# Patient Record
Sex: FEMALE | Race: WHITE | NOT HISPANIC OR LATINO | Employment: FULL TIME | URBAN - METROPOLITAN AREA
[De-identification: names, ages, dates, MRNs, and addresses within clinical notes are randomized per-mention and may not be internally consistent; named-entity substitution may affect disease eponyms.]

---

## 2017-02-20 ENCOUNTER — RECORDS - HEALTHEAST (OUTPATIENT)
Dept: LAB | Facility: CLINIC | Age: 57
End: 2017-02-20

## 2017-02-20 LAB
CHOLEST SERPL-MCNC: 143 MG/DL
FASTING STATUS PATIENT QL REPORTED: NORMAL
HDLC SERPL-MCNC: 51 MG/DL
LDLC SERPL CALC-MCNC: 65 MG/DL
TRIGL SERPL-MCNC: 135 MG/DL

## 2017-10-03 ENCOUNTER — RECORDS - HEALTHEAST (OUTPATIENT)
Dept: LAB | Facility: CLINIC | Age: 57
End: 2017-10-03

## 2017-10-03 LAB
CHOLEST SERPL-MCNC: 175 MG/DL
FASTING STATUS PATIENT QL REPORTED: ABNORMAL
HDLC SERPL-MCNC: 54 MG/DL
LDLC SERPL CALC-MCNC: 88 MG/DL
TRIGL SERPL-MCNC: 166 MG/DL

## 2017-10-11 ENCOUNTER — HOSPITAL ENCOUNTER (OUTPATIENT)
Dept: MAMMOGRAPHY | Facility: HOSPITAL | Age: 57
Discharge: HOME | End: 2017-10-11
Attending: FAMILY MEDICINE

## 2017-10-11 DIAGNOSIS — Z12.31 VISIT FOR SCREENING MAMMOGRAM: ICD-10-CM

## 2019-02-18 ENCOUNTER — HOSPITAL ENCOUNTER (OUTPATIENT)
Dept: MAMMOGRAPHY | Facility: CLINIC | Age: 59
Discharge: HOME | End: 2019-02-18
Attending: FAMILY MEDICINE

## 2019-02-18 DIAGNOSIS — Z12.31 VISIT FOR SCREENING MAMMOGRAM: ICD-10-CM

## 2019-03-06 ENCOUNTER — RECORDS - HEALTHEAST (OUTPATIENT)
Dept: LAB | Facility: CLINIC | Age: 59
End: 2019-03-06

## 2019-03-06 LAB
ALBUMIN SERPL-MCNC: 3.9 G/DL (ref 3.5–5)
ALP SERPL-CCNC: 61 U/L (ref 45–120)
ALT SERPL W P-5'-P-CCNC: 41 U/L (ref 0–45)
ANION GAP SERPL CALCULATED.3IONS-SCNC: 8 MMOL/L (ref 5–18)
AST SERPL W P-5'-P-CCNC: 29 U/L (ref 0–40)
BILIRUB SERPL-MCNC: 0.5 MG/DL (ref 0–1)
BUN SERPL-MCNC: 21 MG/DL (ref 8–22)
CALCIUM SERPL-MCNC: 9.9 MG/DL (ref 8.5–10.5)
CHLORIDE BLD-SCNC: 102 MMOL/L (ref 98–107)
CHOLEST SERPL-MCNC: 171 MG/DL
CO2 SERPL-SCNC: 30 MMOL/L (ref 22–31)
CREAT SERPL-MCNC: 0.87 MG/DL (ref 0.6–1.1)
FASTING STATUS PATIENT QL REPORTED: NORMAL
GFR SERPL CREATININE-BSD FRML MDRD: >60 ML/MIN/1.73M2
GLUCOSE BLD-MCNC: 109 MG/DL (ref 70–125)
HDLC SERPL-MCNC: 57 MG/DL
LDLC SERPL CALC-MCNC: 85 MG/DL
POTASSIUM BLD-SCNC: 4 MMOL/L (ref 3.5–5)
PROT SERPL-MCNC: 6.5 G/DL (ref 6–8)
SODIUM SERPL-SCNC: 140 MMOL/L (ref 136–145)
TRIGL SERPL-MCNC: 145 MG/DL

## 2020-02-10 ENCOUNTER — RECORDS - HEALTHEAST (OUTPATIENT)
Dept: LAB | Facility: CLINIC | Age: 60
End: 2020-02-10

## 2020-02-10 LAB — FSH SERPL-ACNC: 15.7 MIU/ML

## 2020-03-03 ENCOUNTER — HOSPITAL ENCOUNTER (OUTPATIENT)
Dept: MAMMOGRAPHY | Facility: CLINIC | Age: 60
Discharge: HOME | End: 2020-03-03
Attending: FAMILY MEDICINE

## 2020-03-03 DIAGNOSIS — Z12.31 VISIT FOR SCREENING MAMMOGRAM: ICD-10-CM

## 2020-03-18 ENCOUNTER — ANESTHESIA - HEALTHEAST (OUTPATIENT)
Dept: SURGERY | Facility: HOSPITAL | Age: 60
End: 2020-03-18

## 2020-03-18 ASSESSMENT — MIFFLIN-ST. JEOR: SCORE: 1605.95

## 2020-03-19 ENCOUNTER — SURGERY - HEALTHEAST (OUTPATIENT)
Dept: SURGERY | Facility: HOSPITAL | Age: 60
End: 2020-03-19

## 2020-03-19 ASSESSMENT — MIFFLIN-ST. JEOR: SCORE: 1610.94

## 2020-07-21 ENCOUNTER — RECORDS - HEALTHEAST (OUTPATIENT)
Dept: LAB | Facility: CLINIC | Age: 60
End: 2020-07-21

## 2020-07-21 LAB
ALBUMIN SERPL-MCNC: 4.2 G/DL (ref 3.5–5)
ALP SERPL-CCNC: 73 U/L (ref 45–120)
ALT SERPL W P-5'-P-CCNC: 74 U/L (ref 0–45)
ANION GAP SERPL CALCULATED.3IONS-SCNC: 11 MMOL/L (ref 5–18)
AST SERPL W P-5'-P-CCNC: 52 U/L (ref 0–40)
BILIRUB SERPL-MCNC: 0.3 MG/DL (ref 0–1)
BUN SERPL-MCNC: 18 MG/DL (ref 8–22)
CALCIUM SERPL-MCNC: 10 MG/DL (ref 8.5–10.5)
CHLORIDE BLD-SCNC: 104 MMOL/L (ref 98–107)
CHOLEST SERPL-MCNC: 285 MG/DL
CO2 SERPL-SCNC: 25 MMOL/L (ref 22–31)
CREAT SERPL-MCNC: 0.84 MG/DL (ref 0.6–1.1)
FASTING STATUS PATIENT QL REPORTED: ABNORMAL
GFR SERPL CREATININE-BSD FRML MDRD: >60 ML/MIN/1.73M2
GLUCOSE BLD-MCNC: 97 MG/DL (ref 70–125)
HDLC SERPL-MCNC: 50 MG/DL
LDLC SERPL CALC-MCNC: 160 MG/DL
POTASSIUM BLD-SCNC: 4.2 MMOL/L (ref 3.5–5)
PROT SERPL-MCNC: 6.9 G/DL (ref 6–8)
SODIUM SERPL-SCNC: 140 MMOL/L (ref 136–145)
TRIGL SERPL-MCNC: 377 MG/DL

## 2021-03-08 ENCOUNTER — RECORDS - HEALTHEAST (OUTPATIENT)
Dept: LAB | Facility: CLINIC | Age: 61
End: 2021-03-08

## 2021-03-08 LAB
ALBUMIN SERPL-MCNC: 3.9 G/DL (ref 3.5–5)
ALP SERPL-CCNC: 65 U/L (ref 45–120)
ALT SERPL W P-5'-P-CCNC: 32 U/L (ref 0–45)
ANION GAP SERPL CALCULATED.3IONS-SCNC: 9 MMOL/L (ref 5–18)
AST SERPL W P-5'-P-CCNC: 25 U/L (ref 0–40)
BILIRUB SERPL-MCNC: 0.4 MG/DL (ref 0–1)
BUN SERPL-MCNC: 20 MG/DL (ref 8–22)
CALCIUM SERPL-MCNC: 9.3 MG/DL (ref 8.5–10.5)
CHLORIDE BLD-SCNC: 108 MMOL/L (ref 98–107)
CHOLEST SERPL-MCNC: 152 MG/DL
CO2 SERPL-SCNC: 25 MMOL/L (ref 22–31)
CREAT SERPL-MCNC: 0.81 MG/DL (ref 0.6–1.1)
FASTING STATUS PATIENT QL REPORTED: ABNORMAL
GFR SERPL CREATININE-BSD FRML MDRD: >60 ML/MIN/1.73M2
GLUCOSE BLD-MCNC: 89 MG/DL (ref 70–125)
HDLC SERPL-MCNC: 53 MG/DL
LDLC SERPL CALC-MCNC: 65 MG/DL
POTASSIUM BLD-SCNC: 4.5 MMOL/L (ref 3.5–5)
PROT SERPL-MCNC: 6.4 G/DL (ref 6–8)
SODIUM SERPL-SCNC: 142 MMOL/L (ref 136–145)
TRIGL SERPL-MCNC: 168 MG/DL

## 2021-03-09 LAB — HCV AB SERPL QL IA: NEGATIVE

## 2021-03-23 ENCOUNTER — HOSPITAL ENCOUNTER (OUTPATIENT)
Dept: MAMMOGRAPHY | Facility: CLINIC | Age: 61
Discharge: HOME | End: 2021-03-23
Attending: FAMILY MEDICINE

## 2021-03-23 DIAGNOSIS — Z12.31 VISIT FOR SCREENING MAMMOGRAM: ICD-10-CM

## 2021-05-25 ENCOUNTER — RECORDS - HEALTHEAST (OUTPATIENT)
Dept: ADMINISTRATIVE | Facility: CLINIC | Age: 61
End: 2021-05-25

## 2021-05-27 ENCOUNTER — RECORDS - HEALTHEAST (OUTPATIENT)
Dept: ADMINISTRATIVE | Facility: CLINIC | Age: 61
End: 2021-05-27

## 2021-05-31 ENCOUNTER — RECORDS - HEALTHEAST (OUTPATIENT)
Dept: ADMINISTRATIVE | Facility: CLINIC | Age: 61
End: 2021-05-31

## 2021-06-04 VITALS — WEIGHT: 229.1 LBS | HEIGHT: 66 IN | BODY MASS INDEX: 36.82 KG/M2

## 2021-06-07 NOTE — ANESTHESIA CARE TRANSFER NOTE
Last vitals:   Vitals:    03/19/20 0958   BP: 162/78   Pulse: 78   Resp: 12   Temp: 36.4  C (97.6  F)   SpO2: 96%     Patient's level of consciousness is drowsy  Spontaneous respirations: yes  Maintains airway independently: yes  Dentition unchanged: yes  Oropharynx: oropharynx clear of all foreign objects    QCDR Measures:  ASA# 20 - Surgical Safety Checklist: WHO surgical safety checklist completed prior to induction    PQRS# 430 - Adult PONV Prevention: 4558F - Pt received => 2 anti-emetic agents (different classes) preop & intraop  ASA# 8 - Peds PONV Prevention: NA - Not pediatric patient, not GA or 2 or more risk factors NOT present  PQRS# 424 - Lisy-op Temp Management: 4559F - At least one body temp DOCUMENTED => 35.5C or 95.9F within required timeframe  PQRS# 426 - PACU Transfer Protocol: - Transfer of care checklist used  ASA# 14 - Acute Post-op Pain: ASA14B - Patient did NOT experience pain >= 7 out of 10

## 2021-06-07 NOTE — ANESTHESIA POSTPROCEDURE EVALUATION
Patient: Richelle Guadalupe  Procedure(s):  ROBOTIC TOTAL LAPAROSCOPIC HYSTERECTOMY, BILATERAL SALPINGO OOPHORECTOMY, DILITATION AND CURETTAGE WITH FROZEN SECTION, CYSTOSCOPY (Bilateral)  DILATION AND CURETTAGE, UTERUS  Anesthesia type: general    Patient location: PACU  Last vitals:   Vitals Value Taken Time   /79 3/19/2020 10:50 AM   Temp 36.5  C (97.7  F) 3/19/2020 10:50 AM   Pulse 80 3/19/2020 10:56 AM   Resp 14 3/19/2020 10:56 AM   SpO2 97 % 3/19/2020 10:56 AM   Vitals shown include unvalidated device data.  Post vital signs: stable  Level of consciousness: very alert and conversing  Post-anesthesia pain: pain controlled  Post-anesthesia nausea and vomiting: no  Pulmonary: supplemental oxygen  Cardiovascular: stable and blood pressure at baseline  Hydration: adequate  Anesthetic events: no    QCDR Measures:  ASA# 11 - Lisy-op Cardiac Arrest: ASA11B - Patient did NOT experience unanticipated cardiac arrest  ASA# 12 - Lisy-op Mortality Rate: ASA12B - Patient did NOT die  ASA# 13 - PACU Re-Intubation Rate: ASA13B - Patient did NOT require a new airway mgmt  ASA# 10 - Composite Anes Safety: ASA10A - No serious adverse event    Additional Notes:

## 2021-06-07 NOTE — ANESTHESIA PREPROCEDURE EVALUATION
Anesthesia Evaluation        Airway   Mallampati: I  Neck ROM: full   Pulmonary - normal exam   (+) sleep apnea on CPAP, ,                          Cardiovascular - normal exam  Exercise tolerance: > or = 4 METS  (+) , hypercholesterolemia,      Neuro/Psych - negative ROS     Endo/Other    (+) obesity,      GI/Hepatic/Renal - negative ROS      Other findings: Menorrhagia. Stress incontinence.  BMI 36.        Dental - normal exam                        Anesthesia Plan  Planned anesthetic: general endotracheal  Mg++, scop patch, ketamine after induction, toradol at end if ok with surgeon.  Consider background diprivan infusion.  Consented for TAP blocks should they be requested.  ASA 3   Induction: intravenous   Anesthetic plan and risks discussed with: patient  Anesthesia plan special considerations: antiemetics,   Post-op plan: routine recovery

## 2021-06-27 ENCOUNTER — HEALTH MAINTENANCE LETTER (OUTPATIENT)
Age: 61
End: 2021-06-27

## 2021-07-03 NOTE — ADDENDUM NOTE
Addendum Note by Nelson Nicholas CRNA at 3/19/2020 12:07 PM     Author: Nelson Nicholas CRNA Service: -- Author Type: Nurse Anesthetist    Filed: 3/19/2020 12:07 PM Date of Service: 3/19/2020 12:07 PM Status: Signed    : Nelson Nicholas CRNA (Nurse Anesthetist)       Addendum  created 03/19/20 1207 by Nelson Nicholas CRNA    Intraprocedure Flowsheets edited

## 2021-07-13 ENCOUNTER — RECORDS - HEALTHEAST (OUTPATIENT)
Dept: ADMINISTRATIVE | Facility: CLINIC | Age: 61
End: 2021-07-13

## 2021-07-21 ENCOUNTER — RECORDS - HEALTHEAST (OUTPATIENT)
Dept: ADMINISTRATIVE | Facility: CLINIC | Age: 61
End: 2021-07-21

## 2021-10-17 ENCOUNTER — HEALTH MAINTENANCE LETTER (OUTPATIENT)
Age: 61
End: 2021-10-17

## 2022-05-23 ENCOUNTER — ANCILLARY PROCEDURE (OUTPATIENT)
Dept: MAMMOGRAPHY | Facility: CLINIC | Age: 62
End: 2022-05-23
Attending: FAMILY MEDICINE
Payer: COMMERCIAL

## 2022-05-23 DIAGNOSIS — Z12.31 SCREENING MAMMOGRAM, ENCOUNTER FOR: ICD-10-CM

## 2022-05-23 PROCEDURE — 77067 SCR MAMMO BI INCL CAD: CPT

## 2022-07-24 ENCOUNTER — HEALTH MAINTENANCE LETTER (OUTPATIENT)
Age: 62
End: 2022-07-24

## 2022-10-02 ENCOUNTER — HEALTH MAINTENANCE LETTER (OUTPATIENT)
Age: 62
End: 2022-10-02

## 2022-12-06 ENCOUNTER — LAB REQUISITION (OUTPATIENT)
Dept: LAB | Facility: CLINIC | Age: 62
End: 2022-12-06

## 2022-12-06 DIAGNOSIS — R73.03 PREDIABETES: ICD-10-CM

## 2022-12-06 DIAGNOSIS — E78.5 HYPERLIPIDEMIA, UNSPECIFIED: ICD-10-CM

## 2022-12-06 LAB
ALBUMIN SERPL BCG-MCNC: 4.5 G/DL (ref 3.5–5.2)
ALP SERPL-CCNC: 77 U/L (ref 35–104)
ALT SERPL W P-5'-P-CCNC: 48 U/L (ref 10–35)
ANION GAP SERPL CALCULATED.3IONS-SCNC: 13 MMOL/L (ref 7–15)
AST SERPL W P-5'-P-CCNC: 33 U/L (ref 10–35)
BILIRUB SERPL-MCNC: 0.4 MG/DL
BUN SERPL-MCNC: 17.3 MG/DL (ref 8–23)
CALCIUM SERPL-MCNC: 9.7 MG/DL (ref 8.8–10.2)
CHLORIDE SERPL-SCNC: 103 MMOL/L (ref 98–107)
CHOLEST SERPL-MCNC: 159 MG/DL
CREAT SERPL-MCNC: 0.86 MG/DL (ref 0.51–0.95)
DEPRECATED HCO3 PLAS-SCNC: 24 MMOL/L (ref 22–29)
GFR SERPL CREATININE-BSD FRML MDRD: 75 ML/MIN/1.73M2
GLUCOSE SERPL-MCNC: 131 MG/DL (ref 70–99)
HBA1C MFR BLD: 7.5 %
HDLC SERPL-MCNC: 48 MG/DL
LDLC SERPL CALC-MCNC: 89 MG/DL
NONHDLC SERPL-MCNC: 111 MG/DL
POTASSIUM SERPL-SCNC: 4.8 MMOL/L (ref 3.4–5.3)
PROT SERPL-MCNC: 6.6 G/DL (ref 6.4–8.3)
SODIUM SERPL-SCNC: 140 MMOL/L (ref 136–145)
TRIGL SERPL-MCNC: 111 MG/DL

## 2022-12-06 PROCEDURE — 83036 HEMOGLOBIN GLYCOSYLATED A1C: CPT | Performed by: FAMILY MEDICINE

## 2022-12-06 PROCEDURE — 80053 COMPREHEN METABOLIC PANEL: CPT | Performed by: FAMILY MEDICINE

## 2022-12-06 PROCEDURE — 80061 LIPID PANEL: CPT | Performed by: FAMILY MEDICINE

## 2023-05-20 ENCOUNTER — HEALTH MAINTENANCE LETTER (OUTPATIENT)
Age: 63
End: 2023-05-20

## 2024-04-05 ENCOUNTER — TRANSFERRED RECORDS (OUTPATIENT)
Dept: HEALTH INFORMATION MANAGEMENT | Facility: CLINIC | Age: 64
End: 2024-04-05
Payer: COMMERCIAL

## 2024-04-18 ENCOUNTER — MEDICAL CORRESPONDENCE (OUTPATIENT)
Dept: HEALTH INFORMATION MANAGEMENT | Facility: CLINIC | Age: 64
End: 2024-04-18
Payer: COMMERCIAL

## 2024-04-19 ENCOUNTER — TRANSCRIBE ORDERS (OUTPATIENT)
Dept: OTHER | Age: 64
End: 2024-04-19

## 2024-04-19 DIAGNOSIS — R05.3 CHRONIC COUGH: Primary | ICD-10-CM

## 2024-04-22 DIAGNOSIS — R05.3 CHRONIC COUGH: Primary | ICD-10-CM

## 2024-04-22 RX ORDER — ALBUTEROL SULFATE 0.83 MG/ML
2.5 SOLUTION RESPIRATORY (INHALATION) ONCE
Status: COMPLETED | OUTPATIENT
Start: 2024-04-23 | End: 2024-04-23

## 2024-04-23 ENCOUNTER — HOSPITAL ENCOUNTER (OUTPATIENT)
Dept: RESPIRATORY THERAPY | Facility: CLINIC | Age: 64
Discharge: HOME | End: 2024-04-23
Admitting: NURSE PRACTITIONER
Payer: COMMERCIAL

## 2024-04-23 ENCOUNTER — OFFICE VISIT (OUTPATIENT)
Dept: PULMONOLOGY | Facility: CLINIC | Age: 64
End: 2024-04-23
Payer: COMMERCIAL

## 2024-04-23 VITALS
HEART RATE: 67 BPM | OXYGEN SATURATION: 96 % | WEIGHT: 179 LBS | DIASTOLIC BLOOD PRESSURE: 85 MMHG | HEIGHT: 66 IN | SYSTOLIC BLOOD PRESSURE: 144 MMHG | BODY MASS INDEX: 28.77 KG/M2

## 2024-04-23 DIAGNOSIS — J45.30 MILD PERSISTENT REACTIVE AIRWAY DISEASE WITHOUT COMPLICATION: Primary | ICD-10-CM

## 2024-04-23 DIAGNOSIS — R09.82 PND (POST-NASAL DRIP): ICD-10-CM

## 2024-04-23 DIAGNOSIS — R05.3 CHRONIC COUGH: Primary | ICD-10-CM

## 2024-04-23 DIAGNOSIS — R05.3 CHRONIC COUGH: ICD-10-CM

## 2024-04-23 LAB — HGB BLD-MCNC: 14 G/DL (ref 11.7–15.7)

## 2024-04-23 PROCEDURE — 90677 PCV20 VACCINE IM: CPT | Performed by: NURSE PRACTITIONER

## 2024-04-23 PROCEDURE — 94729 DIFFUSING CAPACITY: CPT

## 2024-04-23 PROCEDURE — G0009 ADMIN PNEUMOCOCCAL VACCINE: HCPCS | Performed by: NURSE PRACTITIONER

## 2024-04-23 PROCEDURE — 94060 EVALUATION OF WHEEZING: CPT

## 2024-04-23 PROCEDURE — 36415 COLL VENOUS BLD VENIPUNCTURE: CPT | Performed by: NURSE PRACTITIONER

## 2024-04-23 PROCEDURE — 250N000009 HC RX 250: Performed by: NURSE PRACTITIONER

## 2024-04-23 PROCEDURE — 999N000157 HC STATISTIC RCP TIME EA 10 MIN

## 2024-04-23 PROCEDURE — 99204 OFFICE O/P NEW MOD 45 MIN: CPT | Mod: 25 | Performed by: NURSE PRACTITIONER

## 2024-04-23 PROCEDURE — 94726 PLETHYSMOGRAPHY LUNG VOLUMES: CPT

## 2024-04-23 PROCEDURE — 85018 HEMOGLOBIN: CPT | Performed by: NURSE PRACTITIONER

## 2024-04-23 RX ORDER — BENZONATATE 200 MG/1
CAPSULE ORAL
COMMUNITY
Start: 2024-04-05 | End: 2024-06-10

## 2024-04-23 RX ORDER — SEMAGLUTIDE 1.34 MG/ML
1 INJECTION, SOLUTION SUBCUTANEOUS
COMMUNITY
Start: 2023-02-15

## 2024-04-23 RX ORDER — FLUTICASONE PROPIONATE 50 MCG
1 SPRAY, SUSPENSION (ML) NASAL DAILY
Qty: 16 G | Refills: 3 | Status: SHIPPED | OUTPATIENT
Start: 2024-04-23

## 2024-04-23 RX ORDER — BUDESONIDE AND FORMOTEROL FUMARATE DIHYDRATE 80; 4.5 UG/1; UG/1
2 AEROSOL RESPIRATORY (INHALATION) 2 TIMES DAILY
Qty: 10.2 G | Refills: 4 | Status: SHIPPED | OUTPATIENT
Start: 2024-04-23

## 2024-04-23 RX ORDER — ALBUTEROL SULFATE 90 UG/1
AEROSOL, METERED RESPIRATORY (INHALATION)
COMMUNITY
Start: 2024-02-20

## 2024-04-23 RX ORDER — ATORVASTATIN CALCIUM 20 MG/1
TABLET, FILM COATED ORAL
COMMUNITY
Start: 2022-03-08

## 2024-04-23 RX ADMIN — ALBUTEROL SULFATE 2.5 MG: 2.5 SOLUTION RESPIRATORY (INHALATION) at 07:23

## 2024-04-23 NOTE — PROGRESS NOTES
Pulmonary Outpatient Consult Note  April 23, 2024      Assessment and Plan:   Richelle Guadalupe is a 64 year old female, former smoker, with history of BRANDON (no current CPAP use), HLD presenting today for evaluation of subacute cough.   Cough, PND, and nasal congestion started a few months ago following COVID (tested positive at home). She has had multiple antibiotics and prednisone x 1 and albuterol with relief but the cough has persisted. Worst at night.   No personal or family history of asthma.     PFTs show moderately-severe obstruction with significant bronchodilator response, air trapping, and normal diffusing capacity.   No chest imaging.     Lung exam today demonstrates expiratory wheeze with minimal scatter rhonchi. SpO2 is normal.        #. Cough: Given onset following a URI this is likely post inflammatory response versus underlying reactive airways. As albuterol has been very helpful and there is a very + BD response on PFTs we will trial a daily ICS/LABA.   START Symbicort (80-4.5), 2 puffs BID. Rinse and gargle after each use.   Once resolution of symptoms we will leave her on this for 2-4 weeks and then start to peel this back.   Continue albuterol q4h prn  If continued symptoms at follow up with obtain chest imaging, check [facility], allergy panel, and IgE.   #. PND, nasal congestion: Likely triggering cough. Nighttime symptoms exacerbated by this.   Saline sinus rinses BID  Flonase after saline rinses BID  If continued congestion at follow up with refer to ENT  #. BRANDON, not on CPAP: Has not been using since her machine was recalled. Following weight loss she no longer snores and feels she no longer has BRANDON.   Recommended follow up with sleep medicine for repeat PSG.   #. HCM: UTD with COVID vaccine and booster, TDAP, seasonal flu.  Recommend RSV vaccine  Given PCV 20 today in clinic      RTC 6-8 weeks for recheck    Audra Villarreal CNP  Pulmonary  Medicine  ______________________________________________________________________________    CC:   Chief Complaint   Patient presents with    Consult     R05.3 (ICD-10-CM) - Chronic cough  PFT 24    Patient states she has a CPAP but quit using it.     HPI:   Richelle presents for evaluation of subacte cough.   Cough and nasal congestion started in February following COVID.  Initially had chest tightness, wheezing, shortness of breath and treated with albuterol nebulizer with improvement, azithromycin and prednisone. Given doxycycline and benzonatate at last PCP visit on .     Still having cough and nasal congestion. The symptoms are worse at night when laying down. Productive of yellow sputum.  Denies fever or hemoptysis.   Still having some SOB with activity like stairs or hiking. She still rides her bike and lifts weights daily.   15 pack year history, quit in .     No history of seasonal allergies or issues with chronic nasal congestion in the past.     On Ozempic for weight loss and DM2. She has been on this since 2023.     FH-father had lung cancer. No family history of asthma.     PMH:  There are no problems to display for this patient.    PSH:  Past Surgical History:   Procedure Laterality Date    APPENDECTOMY      BLADDER SUSPENSION       SECTION      HC DILATION/CURETTAGE DIAG/THER NON OB N/A 3/19/2020    Procedure: DILATION AND CURETTAGE, UTERUS;  Surgeon: Christiana Arvizu MD;  [place] [facility] [place]  Service: [place]necology    LAPAROSCOPIC HYSTERECTOMY Bilateral 3/19/2020    Procedure: ROBOTIC TOTAL LAPAROSCOPIC HYSTERECTOMY, BILATERAL SALPINGO OOPHORECTOMY, DILITATION AND CURETTAGE WITH FROZEN SECTION, CYSTOSCOPY;  Surgeon: Christiana Arvizu MD;  [place] [facility] [place]  Service: [place]necology     Current Meds:  Current Outpatient Medications   Medication Sig Dispense Refill    albuterol (PROAIR HFA/PROVENTIL HFA/VENTOLIN HFA) 108 (90 Base) MCG/ACT inhaler INHALE 1  PUFF INTO LUNGS EVERY 4 HOURS AS NEEDED FOR SHORTNESS OF BREATH*      atorvastatin (LIPITOR) 20 MG tablet       B.ani/L.aci/L.sal/L.plan/L.Alex (PROBIOTIC FORMULA ORAL) [B.ANI/L.ACI/L.SAL/L.PLAN/L.ALEX (PROBIOTIC FORMULA ORAL)] Take 1 capsule by mouth daily.       benzonatate (TESSALON) 200 MG capsule TAKE 1 CAPSULE BY MOUTH 3 TIMES DAILY FOR 5 DAYS*      budesonide-formoterol (SYMBICORT) 80-4.5 MCG/ACT Inhaler Inhale 2 puffs into the lungs 2 times daily 10.2 g 4    fish oil-omega-3 fatty acids 300-1,000 mg capsule [FISH OIL-OMEGA-3 FATTY ACIDS 300-1,000 MG CAPSULE] Take 1 g by mouth daily.      fluticasone (FLONASE) 50 MCG/ACT nasal spray Spray 1 spray into both nostrils daily 16 g 3    magnesium oxide 250 mg magnesium Tab [MAGNESIUM OXIDE 250 MG MAGNESIUM TAB] Take 250 mg by mouth daily as needed.      mometasone (ELOCON) 0.1 % cream [MOMETASONE (ELOCON) 0.1 % CREAM] Apply 1 application topically daily as needed.       OZEMPIC, 1 MG/DOSE, 4 MG/3ML pen 1 mg      TURMERIC ORAL [TURMERIC ORAL] Take 1 tablet by mouth daily.       UNABLE TO FIND [UNABLE TO FIND] Take 1 Dose by mouth daily. Med Name: Collagen Peptide Powder       cinnamon bark (CINNAMON ORAL) [CINNAMON BARK (CINNAMON ORAL)] Take 1 packet by mouth daily. The patient uses powder      ibuprofen (ADVIL,MOTRIN) 600 MG tablet [IBUPROFEN (ADVIL,MOTRIN) 600 MG TABLET] Take 1 tablet (600 mg total) by mouth every 6 (six) hours as needed for pain. 30 tablet 0    magnesium 30 mg tablet [MAGNESIUM 30 MG TABLET] Take 30 mg by mouth 2 (two) times a day.      oxyCODONE (ROXICODONE) 5 MG immediate release tablet [OXYCODONE (ROXICODONE) 5 MG IMMEDIATE RELEASE TABLET] Take 1 tablet (5 mg total) by mouth every 6 (six) hours as needed for pain. 12 tablet 0     No current facility-administered medications for this visit.     Allergies:  No Known Allergies    Social Hx:  Marital status: lives with    Number of children:  1 son  Resides in a house,  3yr old, no concern  "for mold.  Occupational history:  or marketing   service: None  Pets: 2 dogs   Smoking history: 3 pack year history  Alcohol use: occasional   Recreational drug use: none, no vape use    Family HX: family history includes Cancer in her father; Heart Disease in her father.    ROS:   10-point review performed and notable for the above mentioned symtoms. The remainder reviewed and negative.     Physical Exam:  BP (!) 144/85 (BP Location: Right arm, Patient Position: Sitting, Cuff Size: Adult Regular)   Pulse 67   Ht 1.676 m (5' 6\")   Wt 81.2 kg (179 lb)   SpO2 96%   BMI 28.89 kg/m      Physical Exam  Constitutional:       General: She is not in acute distress.     Appearance: She is not ill-appearing or diaphoretic.   HENT:      Nose: Congestion present.   Cardiovascular:      Rate and Rhythm: Normal rate and regular rhythm.      Heart sounds: Normal heart sounds.   Pulmonary:      Effort: Pulmonary effort is normal. No respiratory distress.      Breath sounds: Wheezing (end expiratory, diffuse) and rhonchi (scattered, mild, expiratory) present.   Musculoskeletal:      Right lower leg: No edema.      Left lower leg: No edema.   Neurological:      Mental Status: She is alert.   Psychiatric:         Behavior: Behavior normal.       PFT's     4/23/2024      Impression:  Full Pulmonary Function Test is abnormal.  PFTs are consistent with moderate-severe obstructive disease.  Spirometry is consistent with reversibility.  There is no hyperinflation.  There is air-trapping.  Diffusion capacity when corrected for hemoglobin is not reduced.    Labs:   personally reviewed in the EMR.    Imaging studies: personally reviewed and interpreted. Below are the Radiology interpretations.  None.                               "

## 2024-04-23 NOTE — PATIENT INSTRUCTIONS
It was a pleasure seeing you in clinic today. This is what we discussed:    We will have you start a daily long acting inhaler to calm down the inflammation in your airways. Use this every day as prescribed no matter what. Remember to rinse and gargle.   Use your albuterol every 4 hours as needed for cough, shortness of breath, wheeze, or chest tightness. This is short acting.   Do saline sinus rinses at least twice daily. Follow this with Flonase.   Follow up 6 weeks   If you have worsening symptoms, questions, or need to speak with the nurse please call [phone].  I recommend the RSV vaccine. You received the pneumonia vaccine today.

## 2024-04-23 NOTE — PROGRESS NOTES
RESPIRATORY CARE NOTE    Complete Pulmonary Function Test completed by patient.  Good patient effort and cooperation. Albuterol 2.5 mg neb given for bronchodilation.  The results of this test meet the ATS standards for acceptability and repeatability. PT returned to baseline and left in no distress.    Rajwinder Jamil, RT  4/23/2024

## 2024-04-24 LAB
DLCOCOR-%PRED-PRE: 99 %
DLCOCOR-PRE: 20.21 ML/MIN/MMHG
DLCOUNC-%PRED-PRE: 101 %
DLCOUNC-PRE: 20.57 ML/MIN/MMHG
DLCOUNC-PRED: 20.26 ML/MIN/MMHG
ERV-%PRED-PRE: 33 %
ERV-PRE: 0.38 L
ERV-PRED: 1.11 L
EXPTIME-PRE: 7.12 SEC
FEF2575-%PRED-POST: 85 %
FEF2575-%PRED-PRE: 30 %
FEF2575-POST: 1.71 L/SEC
FEF2575-PRE: 0.61 L/SEC
FEF2575-PRED: 2 L/SEC
FEFMAX-%PRED-PRE: 59 %
FEFMAX-PRE: 3.7 L/SEC
FEFMAX-PRED: 6.24 L/SEC
FEV1-%PRED-PRE: 56 %
FEV1-PRE: 1.3 L
FEV1FEV6-PRE: 59 %
FEV1FEV6-PRED: 80 %
FEV1FVC-PRE: 57 %
FEV1FVC-PRED: 79 %
FEV1SVC-PRE: 57 %
FEV1SVC-PRED: 69 %
FIFMAX-PRE: 3.29 L/SEC
FRCPLETH-%PRED-PRE: 138 %
FRCPLETH-PRE: 3.91 L
FRCPLETH-PRED: 2.82 L
FVC-%PRED-PRE: 77 %
FVC-PRE: 2.27 L
FVC-PRED: 2.94 L
IC-%PRED-PRE: 85 %
IC-PRE: 1.9 L
IC-PRED: 2.23 L
RVPLETH-%PRED-PRE: 168 %
RVPLETH-PRE: 3.53 L
RVPLETH-PRED: 2.09 L
TLCPLETH-%PRED-PRE: 110 %
TLCPLETH-PRE: 5.81 L
TLCPLETH-PRED: 5.27 L
VA-%PRED-PRE: 104 %
VA-PRE: 5.14 L
VC-%PRED-PRE: 67 %
VC-PRE: 2.28 L
VC-PRED: 3.37 L

## 2024-06-03 ENCOUNTER — ANCILLARY PROCEDURE (OUTPATIENT)
Dept: MAMMOGRAPHY | Facility: CLINIC | Age: 64
End: 2024-06-03
Attending: FAMILY MEDICINE
Payer: COMMERCIAL

## 2024-06-03 DIAGNOSIS — Z12.31 VISIT FOR SCREENING MAMMOGRAM: ICD-10-CM

## 2024-06-03 PROCEDURE — 77063 BREAST TOMOSYNTHESIS BI: CPT

## 2024-06-10 ENCOUNTER — OFFICE VISIT (OUTPATIENT)
Dept: PULMONOLOGY | Facility: CLINIC | Age: 64
End: 2024-06-10
Attending: NURSE PRACTITIONER
Payer: COMMERCIAL

## 2024-06-10 VITALS
WEIGHT: 188 LBS | HEART RATE: 78 BPM | BODY MASS INDEX: 30.34 KG/M2 | DIASTOLIC BLOOD PRESSURE: 76 MMHG | SYSTOLIC BLOOD PRESSURE: 138 MMHG | OXYGEN SATURATION: 98 %

## 2024-06-10 DIAGNOSIS — J30.2 SEASONAL ALLERGIC RHINITIS, UNSPECIFIED TRIGGER: ICD-10-CM

## 2024-06-10 DIAGNOSIS — R05.3 CHRONIC COUGH: Primary | ICD-10-CM

## 2024-06-10 PROCEDURE — 99214 OFFICE O/P EST MOD 30 MIN: CPT | Performed by: NURSE PRACTITIONER

## 2024-06-10 NOTE — PROGRESS NOTES
Pulmonary Outpatient Consult Note  [place] 10, 2024      Assessment and Plan:   Richelle Guadalupe is a 64 year old female, former smoker, with history of BRANDON (no current CPAP use), HLD presenting today for evaluation of subacute cough.   Cough, PND, and nasal congestion started a few months ago following COVID (tested positive at home). She had multiple antibiotics and prednisone x 1 and albuterol with relief but the cough persisted.  Noted resolution of symptoms with regular Symbicort and Flonase use.  She did develop a hoarse voice.  No personal or family history of asthma.     PFTs show moderately-severe obstruction with significant bronchodilator response, air trapping, and normal diffusing capacity.   No chest imaging.   Lung exam today demonstrates expiratory wheeze with minimal scatter rhonchi. SpO2 is normal.      #. Cough: Given onset following a URI this is likely post inflammatory response versus underlying reactive airways.  Will start to slowly peel back the daily ICS/LABA.  We will investigate if there is an environmental or allergic trigger.  Continue Symbicort (80-4.5), 1 puff BID x 1 month.  If symptoms do not worsen stop use.  At any point if symptoms worsen or return suggest restarting daily use as prescribed.  Rinse and gargle after each use.   Continue albuterol q4h prn  CBC with diff, [place] allergy panel, and IgE.    #. PND, nasal congestion: Resolved with below interventions.  Was likely triggering cough.  Continue saline sinus rinses as needed  Flonase 1-2 times daily as needed  #. BRANDON, not on CPAP: Has not been using since her machine was recalled. Following weight loss she no longer snores and feels she no longer has BRANDON.   Recommended follow up with sleep medicine for repeat PSG.   #. HCM: UTD with COVID vaccine and booster, PCV 20, TDAP, seasonal flu.  Recommend RSV vaccine      RTC 6 months if symptoms return after peeling back ICS     Audra Villarreal, CNP  Pulmonary  "Medicine  ______________________________________________________________________________    CC:   Chief Complaint   Patient presents with    Follow Up     Mild persistent reactive airway disease without complication    Chronic cough    PND (post-nasal drip)        HPI:   Richelle was last seen in clinic on .  At that time we started a trial of a low-dose ICS/LABA with good results.    Today she reports a hoarse voice or \"voice change\".   No cough, wheeze, or nasal congestion.   Has been using Flonase consistently with resolution of PND. She is interested in an allergy panel to see if she has sensitivities.   Cough and nasal congestion started in February following COVID.  Initially had chest tightness, wheezing, shortness of breath and treated with albuterol nebulizer with improvement, azithromycin and prednisone.     She still rides her bike and lifts weights daily.   15 pack year history, quit in .   No history of seasonal allergies or issues with chronic nasal congestion in the past.   On Ozempic for weight loss and DM2. She has been on this since 2023.     FH-father had lung cancer. No family history of asthma.     PMH:  There are no problems to display for this patient.    PSH:  Past Surgical History:   Procedure Laterality Date    APPENDECTOMY      BLADDER SUSPENSION       SECTION      HC DILATION/CURETTAGE DIAG/THER NON OB N/A 3/19/2020    Procedure: DILATION AND CURETTAGE, UTERUS;  Surgeon: Christiana Arvizu MD;  Location: [facility] - [place];  Service: Gynecology    LAPAROSCOPIC HYSTERECTOMY Bilateral 3/19/2020    Procedure: ROBOTIC TOTAL LAPAROSCOPIC HYSTERECTOMY, BILATERAL SALPINGO OOPHORECTOMY, DILITATION AND CURETTAGE WITH FROZEN SECTION, CYSTOSCOPY;  Surgeon: Christiana Arvizu MD;  Location: [facility] - [place];  Service: Gynecology     Current Meds:  Current Outpatient Medications   Medication Sig Dispense Refill    albuterol (PROAIR HFA/PROVENTIL HFA/VENTOLIN HFA) 108 (90 " Base) MCG/ACT inhaler INHALE 1 PUFF INTO LUNGS EVERY 4 HOURS AS NEEDED FOR SHORTNESS OF BREATH*      atorvastatin (LIPITOR) 20 MG tablet       B.ani/L.aci/L.sal/L.plan/L.Alex (PROBIOTIC FORMULA ORAL) [B.ANI/L.ACI/L.SAL/L.PLAN/L.ALEX (PROBIOTIC FORMULA ORAL)] Take 1 capsule by mouth daily.       budesonide-formoterol (SYMBICORT) 80-4.5 MCG/ACT Inhaler Inhale 2 puffs into the lungs 2 times daily 10.2 g 4    fish oil-omega-3 fatty acids 300-1,000 mg capsule [FISH OIL-OMEGA-3 FATTY ACIDS 300-1,000 MG CAPSULE] Take 1 g by mouth daily.      fluticasone (FLONASE) 50 MCG/ACT nasal spray Spray 1 spray into both nostrils daily 16 g 3    magnesium oxide 250 mg magnesium Tab [MAGNESIUM OXIDE 250 MG MAGNESIUM TAB] Take 250 mg by mouth daily as needed.      mometasone (ELOCON) 0.1 % cream [MOMETASONE (ELOCON) 0.1 % CREAM] Apply 1 application topically daily as needed.       OZEMPIC, 1 MG/DOSE, 4 MG/3ML pen 1 mg      TURMERIC ORAL [TURMERIC ORAL] Take 1 tablet by mouth daily.       UNABLE TO FIND [UNABLE TO FIND] Take 1 Dose by mouth daily. Med Name: Collagen Peptide Powder       benzonatate (TESSALON) 200 MG capsule TAKE 1 CAPSULE BY MOUTH 3 TIMES DAILY FOR 5 DAYS*      cinnamon bark (CINNAMON ORAL) [CINNAMON BARK (CINNAMON ORAL)] Take 1 packet by mouth daily. The patient uses powder      ibuprofen (ADVIL,MOTRIN) 600 MG tablet [IBUPROFEN (ADVIL,MOTRIN) 600 MG TABLET] Take 1 tablet (600 mg total) by mouth every 6 (six) hours as needed for pain. 30 tablet 0    magnesium 30 mg tablet [MAGNESIUM 30 MG TABLET] Take 30 mg by mouth 2 (two) times a day.      oxyCODONE (ROXICODONE) 5 MG immediate release tablet [OXYCODONE (ROXICODONE) 5 MG IMMEDIATE RELEASE TABLET] Take 1 tablet (5 mg total) by mouth every 6 (six) hours as needed for pain. 12 tablet 0     No current facility-administered medications for this visit.     Allergies:  No Known Allergies    Social Hx:  Marital status: lives with    Number of children:  1 son  Resides in a  house,  3yr old, no concern for mold.  Occupational history:  or marketing   service: None  Pets: 2 dogs   Smoking history: 3 pack year history  Alcohol use: occasional   Recreational drug use: none, no vape use    Family HX: family history includes Cancer in her father; Heart Disease in her father.    ROS:   10-point review performed and notable for the above mentioned symtoms. The remainder reviewed and negative.     Physical Exam:  /76 (BP Location: Right arm, Patient Position: Sitting, Cuff Size: Adult Regular)   Pulse 78   Wt 85.3 kg (188 lb)   SpO2 98%   BMI 30.34 kg/m      Physical Exam  Constitutional:       General: She is not in acute distress.     Appearance: She is not ill-appearing or diaphoretic.   Cardiovascular:      Rate and Rhythm: Normal rate and regular rhythm.      Heart sounds: Normal heart sounds.   Pulmonary:      Effort: Pulmonary effort is normal. No respiratory distress.      Breath sounds: No wheezing or rhonchi.   Musculoskeletal:      Right lower leg: No edema.      Left lower leg: No edema.   Neurological:      Mental Status: She is alert.   Psychiatric:         Behavior: Behavior normal.       PFT's     4/23/2024      Impression:  Full Pulmonary Function Test is abnormal.  PFTs are consistent with moderate-severe obstructive disease.  Spirometry is consistent with reversibility.  There is no hyperinflation.  There is air-trapping.  Diffusion capacity when corrected for hemoglobin is not reduced.    Labs:   personally reviewed in the EMR.    Imaging studies: personally reviewed and interpreted. Below are the Radiology interpretations.  None.

## 2024-06-10 NOTE — PATIENT INSTRUCTIONS
It was a pleasure seeing you in clinic today. This is what we discussed:    Drop down to 2 puffs of the Symbicort once daily for 1 month. If no return of symptoms, stop use. At any time if your symptoms return or worsen re-start the Symbicort.   Use your albuterol every 4 hours as needed for cough, shortness of breath, wheeze, or chest tightness.   Follow up 6 months.   If you have worsening symptoms, questions, or need to speak with the nurse please call [phone].

## 2024-06-11 ENCOUNTER — ANCILLARY PROCEDURE (OUTPATIENT)
Dept: MAMMOGRAPHY | Facility: CLINIC | Age: 64
End: 2024-06-11
Attending: FAMILY MEDICINE
Payer: COMMERCIAL

## 2024-06-11 DIAGNOSIS — N64.89 BREAST ASYMMETRY: ICD-10-CM

## 2024-06-11 PROCEDURE — 77065 DX MAMMO INCL CAD UNI: CPT | Mod: RT

## 2024-06-26 ENCOUNTER — LAB (OUTPATIENT)
Dept: LAB | Facility: HOSPITAL | Age: 64
End: 2024-06-26
Payer: COMMERCIAL

## 2024-06-26 DIAGNOSIS — J30.2 SEASONAL ALLERGIC RHINITIS, UNSPECIFIED TRIGGER: ICD-10-CM

## 2024-06-26 LAB
BASOPHILS # BLD AUTO: 0.1 10E3/UL (ref 0–0.2)
BASOPHILS NFR BLD AUTO: 1 %
EOSINOPHIL # BLD AUTO: 0.4 10E3/UL (ref 0–0.7)
EOSINOPHIL NFR BLD AUTO: 5 %
ERYTHROCYTE [DISTWIDTH] IN BLOOD BY AUTOMATED COUNT: 12.5 % (ref 10–15)
HCT VFR BLD AUTO: 45 % (ref 35–47)
HGB BLD-MCNC: 14.3 G/DL (ref 11.7–15.7)
IMM GRANULOCYTES # BLD: 0 10E3/UL
IMM GRANULOCYTES NFR BLD: 0 %
LYMPHOCYTES # BLD AUTO: 1.8 10E3/UL (ref 0.8–5.3)
LYMPHOCYTES NFR BLD AUTO: 26 %
MCH RBC QN AUTO: 29.9 PG (ref 26.5–33)
MCHC RBC AUTO-ENTMCNC: 31.8 G/DL (ref 31.5–36.5)
MCV RBC AUTO: 94 FL (ref 78–100)
MONOCYTES # BLD AUTO: 0.5 10E3/UL (ref 0–1.3)
MONOCYTES NFR BLD AUTO: 7 %
NEUTROPHILS # BLD AUTO: 4.1 10E3/UL (ref 1.6–8.3)
NEUTROPHILS NFR BLD AUTO: 60 %
NRBC # BLD AUTO: 0 10E3/UL
NRBC BLD AUTO-RTO: 0 /100
PLATELET # BLD AUTO: 226 10E3/UL (ref 150–450)
RBC # BLD AUTO: 4.79 10E6/UL (ref 3.8–5.2)
WBC # BLD AUTO: 6.9 10E3/UL (ref 4–11)

## 2024-06-26 PROCEDURE — 36415 COLL VENOUS BLD VENIPUNCTURE: CPT

## 2024-06-26 PROCEDURE — 82785 ASSAY OF IGE: CPT

## 2024-06-26 PROCEDURE — 85025 COMPLETE CBC W/AUTO DIFF WBC: CPT

## 2024-06-28 LAB
A ALTERNATA IGE QN: <0.1 KU(A)/L
A FUMIGATUS IGE QN: 0.12 KU(A)/L
BERMUDA GRASS IGE QN: 0.19 KU(A)/L
C HERBARUM IGE QN: <0.1 KU(A)/L
CAT DANDER IGG QN: <0.1 KU(A)/L
CEDAR IGE QN: 0.11 KU(A)/L
COMMON RAGWEED IGE QN: <0.1 KU(A)/L
COTTONWOOD IGE QN: <0.1 KU(A)/L
D FARINAE IGE QN: <0.1 KU(A)/L
D PTERONYSS IGE QN: <0.1 KU(A)/L
DOG DANDER+EPITH IGE QN: <0.1 KU(A)/L
IGE SERPL-ACNC: 52 KU/L (ref 0–114)
MAPLE IGE QN: <0.1 KU(A)/L
MARSH ELDER IGE QN: <0.1 KU(A)/L
MOUSE URINE PROT IGE QN: <0.1 KU(A)/L
NETTLE IGE QN: <0.1 KU(A)/L
P NOTATUM IGE QN: <0.1 KU(A)/L
ROACH IGE QN: <0.1 KU(A)/L
SALTWORT IGE QN: <0.1 KU(A)/L
SILVER BIRCH IGE QN: <0.1 KU(A)/L
TIMOTHY IGE QN: 0.13 KU(A)/L
WHITE ASH IGE QN: 0.12 KU(A)/L
WHITE ELM IGE QN: 0.15 KU(A)/L
WHITE MULBERRY IGE QN: <0.1 KU(A)/L
WHITE OAK IGE QN: 0.11 KU(A)/L

## 2024-07-27 ENCOUNTER — HEALTH MAINTENANCE LETTER (OUTPATIENT)
Age: 64
End: 2024-07-27

## 2024-07-29 ENCOUNTER — LAB REQUISITION (OUTPATIENT)
Dept: LAB | Facility: CLINIC | Age: 64
End: 2024-07-29

## 2024-07-29 DIAGNOSIS — E78.5 HYPERLIPIDEMIA, UNSPECIFIED: ICD-10-CM

## 2024-07-29 DIAGNOSIS — E11.9 TYPE 2 DIABETES MELLITUS WITHOUT COMPLICATIONS (H): ICD-10-CM

## 2024-07-29 PROCEDURE — 82043 UR ALBUMIN QUANTITATIVE: CPT | Performed by: FAMILY MEDICINE

## 2024-07-29 PROCEDURE — 82040 ASSAY OF SERUM ALBUMIN: CPT | Performed by: FAMILY MEDICINE

## 2024-07-29 PROCEDURE — 80061 LIPID PANEL: CPT | Performed by: FAMILY MEDICINE

## 2024-07-30 LAB
ALBUMIN SERPL BCG-MCNC: 4.3 G/DL (ref 3.5–5.2)
ALP SERPL-CCNC: 51 U/L (ref 40–150)
ALT SERPL W P-5'-P-CCNC: 39 U/L (ref 0–50)
ANION GAP SERPL CALCULATED.3IONS-SCNC: 9 MMOL/L (ref 7–15)
AST SERPL W P-5'-P-CCNC: 26 U/L (ref 0–45)
BILIRUB SERPL-MCNC: 0.4 MG/DL
BUN SERPL-MCNC: 18.1 MG/DL (ref 8–23)
CALCIUM SERPL-MCNC: 9.6 MG/DL (ref 8.8–10.4)
CHLORIDE SERPL-SCNC: 102 MMOL/L (ref 98–107)
CHOLEST SERPL-MCNC: 152 MG/DL
CREAT SERPL-MCNC: 1.08 MG/DL (ref 0.51–0.95)
CREAT UR-MCNC: 30.4 MG/DL
EGFRCR SERPLBLD CKD-EPI 2021: 56 ML/MIN/1.73M2
FASTING STATUS PATIENT QL REPORTED: ABNORMAL
FASTING STATUS PATIENT QL REPORTED: ABNORMAL
GLUCOSE SERPL-MCNC: 96 MG/DL (ref 70–99)
HCO3 SERPL-SCNC: 27 MMOL/L (ref 22–29)
HDLC SERPL-MCNC: 53 MG/DL
LDLC SERPL CALC-MCNC: 68 MG/DL
MICROALBUMIN UR-MCNC: <12 MG/L
MICROALBUMIN/CREAT UR: NORMAL MG/G{CREAT}
NONHDLC SERPL-MCNC: 99 MG/DL
POTASSIUM SERPL-SCNC: 4.2 MMOL/L (ref 3.4–5.3)
PROT SERPL-MCNC: 6.8 G/DL (ref 6.4–8.3)
SODIUM SERPL-SCNC: 138 MMOL/L (ref 135–145)
TRIGL SERPL-MCNC: 157 MG/DL

## 2024-10-05 ENCOUNTER — HEALTH MAINTENANCE LETTER (OUTPATIENT)
Age: 64
End: 2024-10-05

## 2024-12-10 ENCOUNTER — OFFICE VISIT (OUTPATIENT)
Dept: PULMONOLOGY | Facility: CLINIC | Age: 64
End: 2024-12-10
Attending: NURSE PRACTITIONER
Payer: COMMERCIAL

## 2024-12-10 VITALS
HEART RATE: 65 BPM | SYSTOLIC BLOOD PRESSURE: 138 MMHG | BODY MASS INDEX: 30.83 KG/M2 | WEIGHT: 191 LBS | OXYGEN SATURATION: 96 % | DIASTOLIC BLOOD PRESSURE: 84 MMHG

## 2024-12-10 DIAGNOSIS — J45.20 MILD INTERMITTENT REACTIVE AIRWAY DISEASE WITHOUT COMPLICATION: Primary | ICD-10-CM

## 2024-12-10 DIAGNOSIS — J30.2 SEASONAL ALLERGIC RHINITIS, UNSPECIFIED TRIGGER: ICD-10-CM

## 2024-12-10 PROCEDURE — 99214 OFFICE O/P EST MOD 30 MIN: CPT | Performed by: NURSE PRACTITIONER

## 2024-12-10 RX ORDER — BUDESONIDE AND FORMOTEROL FUMARATE DIHYDRATE 80; 4.5 UG/1; UG/1
2 AEROSOL RESPIRATORY (INHALATION) 2 TIMES DAILY PRN
Qty: 10.2 G | Refills: 4 | Status: SHIPPED | OUTPATIENT
Start: 2024-12-10

## 2024-12-10 NOTE — PATIENT INSTRUCTIONS
It was a pleasure seeing you in clinic today. This is what we discussed:    Continue the Symbicort 1-2 times daily as needed for cough.   You have some tree and grass sensitivity, you may have increased symptoms in the spring/summer.   Use your albuterol every 4 hours as needed for cough, shortness of breath, wheeze, or chest tightness.   Follow up as needed   If you have worsening symptoms, questions, or need to speak with the nurse please call [phone].

## 2024-12-10 NOTE — PROGRESS NOTES
Pulmonary Outpatient Clinic Note  December 10, 2024      Assessment and Plan:   Richelle Guadalupe is a 64 year old female, former smoker, with history of BRANDON (no current CPAP use), HLD presenting today for follow up of subacute cough.   Cough, PND, and nasal congestion started a few months ago following COVID (tested positive at home). She had multiple antibiotics and prednisone x 1 and albuterol with relief but the cough persisted.  Noted resolution of symptoms with regular Symbicort and Flonase use.  She did develop a hoarse voice.  No personal or family history of asthma.     PFTs show moderately-severe obstruction with significant bronchodilator response, air trapping, and normal diffusing capacity.   No chest imaging.   Lung exam today demonstrates expiratory wheeze with minimal scatter rhonchi. SpO2 is normal.      #. Cough: Given onset following a URI this is likely post inflammatory response versus underlying asthma/reactive airways. PFTs consistent with asthma. Has stopped daily Symbicort use and denies return of symptoms. She does have some mild sensitivity to grasses and trees on [facility] allergy panel.   Continue Symbicort (80-4.5), 2 puffs BID PRN. At any point if symptoms worsen or return suggest restarting daily use as prescribed until resolution.  Rinse and gargle after each use.   Continue albuterol q4h prn  #. PND, nasal congestion: Resolved with below interventions.  Was likely triggering cough.  Continue saline sinus rinses as needed  Flonase 1-2 times daily as needed  #. BRANDON, not on CPAP: Has not been using since her machine was recalled. Following weight loss she no longer snores and feels she no longer has BRANDON.   Recommended follow up with sleep medicine for repeat PSG.   #. HCM: UTD with COVID vaccine and booster, PCV 20, TDAP, seasonal flu.  Recommend RSV vaccine      RTC prn     Audra Villarreal, THADDEUS  Pulmonary  Medicine  ______________________________________________________________________________    CC:   Chief Complaint   Patient presents with    Follow Up       6 MONTH FOLLOW UP:  Chronic cough  Seasonal allergic rhinitis, unspecified trigger         HPI:   Richelle was last seen in clinic in 2024.  At that time her symptoms have pretty much resolved so she started a taper off of the ICS/LABA with good results.  Currently denies any cough, wheeze, or chest tightness.  Denies return of hoarse voice.  Has been not been using Flonase consistently after resolution of PND.    Cough and nasal congestion started in February following COVID.  Initially had chest tightness, wheezing, shortness of breath and treated with albuterol nebulizer with improvement, azithromycin and prednisone.   She still rides her bike and lifts weights daily.   15 pack year history, quit in .   No history of seasonal allergies or issues with chronic nasal congestion in the past.   On Ozempic for weight loss and DM2. She has been on this since 2023.     FH-father had lung cancer. No family history of asthma.     PMH:  There are no active problems to display for this patient.    PSH:  Past Surgical History:   Procedure Laterality Date    APPENDECTOMY      BLADDER SUSPENSION       SECTION      HC DILATION/CURETTAGE DIAG/THER NON OB N/A 3/19/2020    Procedure: DILATION AND CURETTAGE, UTERUS;  Surgeon: Christiana Arvizu MD;  Location: [facility];  Service: Gynecology    LAPAROSCOPIC HYSTERECTOMY Bilateral 3/19/2020    Procedure: ROBOTIC TOTAL LAPAROSCOPIC HYSTERECTOMY, BILATERAL SALPINGO OOPHORECTOMY, DILITATION AND CURETTAGE WITH FROZEN SECTION, CYSTOSCOPY;  Surgeon: Christiana Arvizu MD;  Location: [facility];  Service: Gynecology     Current Meds:  Current Outpatient Medications   Medication Sig Dispense Refill    atorvastatin (LIPITOR) 20 MG tablet       B.ani/L.aci/L.sal/L.plan/L.Alex (PROBIOTIC FORMULA ORAL)  [B.ANI/L.ACI/L.SAL/L.PLAN/L.KOKO (PROBIOTIC FORMULA ORAL)] Take 1 capsule by mouth daily.       budesonide-formoterol (SYMBICORT) 80-4.5 MCG/ACT Inhaler Inhale 2 puffs into the lungs 2 times daily as needed (for cough or wheeze). 10.2 g 4    fish oil-omega-3 fatty acids 300-1,000 mg capsule [FISH OIL-OMEGA-3 FATTY ACIDS 300-1,000 MG CAPSULE] Take 1 g by mouth daily.      magnesium oxide 250 mg magnesium Tab [MAGNESIUM OXIDE 250 MG MAGNESIUM TAB] Take 250 mg by mouth daily as needed.      mometasone (ELOCON) 0.1 % cream [MOMETASONE (ELOCON) 0.1 % CREAM] Apply 1 application topically daily as needed.       OZEMPIC, 1 MG/DOSE, 4 MG/3ML pen 1 mg      TURMERIC ORAL [TURMERIC ORAL] Take 1 tablet by mouth daily.       UNABLE TO FIND [UNABLE TO FIND] Take 1 Dose by mouth daily. Med Name: Collagen Peptide Powder       albuterol (PROAIR HFA/PROVENTIL HFA/VENTOLIN HFA) 108 (90 Base) MCG/ACT inhaler INHALE 1 PUFF INTO LUNGS EVERY 4 HOURS AS NEEDED FOR SHORTNESS OF BREATH* (Patient not taking: Reported on 12/10/2024)      cinnamon bark (CINNAMON ORAL) [CINNAMON BARK (CINNAMON ORAL)] Take 1 packet by mouth daily. The patient uses powder      fluticasone (FLONASE) 50 MCG/ACT nasal spray Spray 1 spray into both nostrils daily (Patient not taking: Reported on 12/10/2024) 16 g 3    ibuprofen (ADVIL,MOTRIN) 600 MG tablet [IBUPROFEN (ADVIL,MOTRIN) 600 MG TABLET] Take 1 tablet (600 mg total) by mouth every 6 (six) hours as needed for pain. 30 tablet 0    oxyCODONE (ROXICODONE) 5 MG immediate release tablet [OXYCODONE (ROXICODONE) 5 MG IMMEDIATE RELEASE TABLET] Take 1 tablet (5 mg total) by mouth every 6 (six) hours as needed for pain. 12 tablet 0     No current facility-administered medications for this visit.     Allergies:  No Known Allergies    Social Hx:  Marital status: lives with    Number of children:  1 son  Resides in a house,  3yr old, no concern for mold.  Occupational history:  or marketing   service:  None  Pets: 2 dogs   Smoking history: 3 pack year history  Alcohol use: occasional   Recreational drug use: none, no vape use    Family HX: family history includes Cancer in her father; Heart Disease in her father.    ROS:   10-point review performed and notable for the above mentioned symtoms. The remainder reviewed and negative.     Physical Exam:  /84   Pulse 65   Wt 86.6 kg (191 lb)   SpO2 96%   BMI 30.83 kg/m      Physical Exam  Constitutional:       General: She is not in acute distress.     Appearance: She is not ill-appearing or diaphoretic.   Cardiovascular:      Rate and Rhythm: Normal rate and regular rhythm.      Heart sounds: Normal heart sounds.   Pulmonary:      Effort: Pulmonary effort is normal. No respiratory distress.      Breath sounds: No wheezing or rhonchi.   Musculoskeletal:      Right lower leg: No edema.      Left lower leg: No edema.   Neurological:      Mental Status: She is alert.   Psychiatric:         Behavior: Behavior normal.       PFT's     4/23/2024      Impression:  Full Pulmonary Function Test is abnormal.  PFTs are consistent with moderate-severe obstructive disease.  Spirometry is consistent with reversibility.  There is no hyperinflation.  There is air-trapping.  Diffusion capacity when corrected for hemoglobin is not reduced.    Labs:   personally reviewed in the EMR.    Imaging studies: personally reviewed and interpreted. Below are the Radiology interpretations.  None.

## 2024-12-23 ENCOUNTER — ANCILLARY PROCEDURE (OUTPATIENT)
Dept: MAMMOGRAPHY | Facility: CLINIC | Age: 64
End: 2024-12-23
Attending: FAMILY MEDICINE
Payer: COMMERCIAL

## 2024-12-23 DIAGNOSIS — R92.1 BREAST CALCIFICATION SEEN ON MAMMOGRAM: ICD-10-CM

## 2024-12-23 DIAGNOSIS — R92.8 BI-RADS CATEGORY 3 MAMMOGRAM RESULT: ICD-10-CM

## 2024-12-23 PROCEDURE — 77065 DX MAMMO INCL CAD UNI: CPT | Mod: RT

## 2025-01-19 ENCOUNTER — HEALTH MAINTENANCE LETTER (OUTPATIENT)
Age: 65
End: 2025-01-19

## 2025-04-27 ENCOUNTER — HEALTH MAINTENANCE LETTER (OUTPATIENT)
Age: 65
End: 2025-04-27

## 2025-06-27 ENCOUNTER — ANCILLARY PROCEDURE (OUTPATIENT)
Dept: MAMMOGRAPHY | Facility: CLINIC | Age: 65
End: 2025-06-27
Attending: FAMILY MEDICINE
Payer: COMMERCIAL

## 2025-06-27 DIAGNOSIS — R92.8 CATEGORY 3 MAMMOGRAPHY RESULT WITH SHORT FOLLOW-UP INTERVAL SUGGESTED FOR PROBABLY BENIGN FINDING: ICD-10-CM

## 2025-06-27 PROCEDURE — 77062 BREAST TOMOSYNTHESIS BI: CPT

## 2025-08-10 ENCOUNTER — HEALTH MAINTENANCE LETTER (OUTPATIENT)
Age: 65
End: 2025-08-10

## 2025-08-21 ENCOUNTER — LAB REQUISITION (OUTPATIENT)
Dept: LAB | Facility: CLINIC | Age: 65
End: 2025-08-21
Payer: COMMERCIAL

## 2025-08-21 DIAGNOSIS — E11.9 TYPE 2 DIABETES MELLITUS WITHOUT COMPLICATIONS (H): ICD-10-CM

## 2025-08-21 DIAGNOSIS — E78.5 HYPERLIPIDEMIA, UNSPECIFIED: ICD-10-CM

## 2025-08-21 LAB
ALBUMIN SERPL BCG-MCNC: 4.1 G/DL (ref 3.5–5.2)
ALP SERPL-CCNC: 41 U/L (ref 40–150)
ALT SERPL W P-5'-P-CCNC: 32 U/L (ref 0–50)
ANION GAP SERPL CALCULATED.3IONS-SCNC: 11 MMOL/L (ref 7–15)
AST SERPL W P-5'-P-CCNC: 30 U/L (ref 0–45)
BILIRUB SERPL-MCNC: 0.5 MG/DL
BUN SERPL-MCNC: 11.7 MG/DL (ref 8–23)
CALCIUM SERPL-MCNC: 9.6 MG/DL (ref 8.8–10.4)
CHLORIDE SERPL-SCNC: 104 MMOL/L (ref 98–107)
CHOLEST SERPL-MCNC: 232 MG/DL
CREAT SERPL-MCNC: 0.95 MG/DL (ref 0.51–0.95)
CREAT UR-MCNC: 232 MG/DL
EGFRCR SERPLBLD CKD-EPI 2021: 65 ML/MIN/1.73M2
FASTING STATUS PATIENT QL REPORTED: ABNORMAL
FASTING STATUS PATIENT QL REPORTED: ABNORMAL
GLUCOSE SERPL-MCNC: 114 MG/DL (ref 70–99)
HCO3 SERPL-SCNC: 25 MMOL/L (ref 22–29)
HDLC SERPL-MCNC: 50 MG/DL
LDLC SERPL CALC-MCNC: 165 MG/DL
MICROALBUMIN UR-MCNC: <12 MG/L
MICROALBUMIN/CREAT UR: NORMAL MG/G{CREAT}
NONHDLC SERPL-MCNC: 182 MG/DL
POTASSIUM SERPL-SCNC: 3.9 MMOL/L (ref 3.4–5.3)
PROT SERPL-MCNC: 6.4 G/DL (ref 6.4–8.3)
SODIUM SERPL-SCNC: 140 MMOL/L (ref 135–145)
TRIGL SERPL-MCNC: 84 MG/DL